# Patient Record
Sex: FEMALE | URBAN - METROPOLITAN AREA
[De-identification: names, ages, dates, MRNs, and addresses within clinical notes are randomized per-mention and may not be internally consistent; named-entity substitution may affect disease eponyms.]

---

## 2022-12-05 ENCOUNTER — APPOINTMENT (RX ONLY)
Dept: URBAN - METROPOLITAN AREA CLINIC 95 | Facility: CLINIC | Age: 67
Setting detail: DERMATOLOGY
End: 2022-12-05

## 2022-12-05 DIAGNOSIS — L20.89 OTHER ATOPIC DERMATITIS: ICD-10-CM

## 2022-12-05 PROBLEM — L20.84 INTRINSIC (ALLERGIC) ECZEMA: Status: ACTIVE | Noted: 2022-12-05

## 2022-12-05 PROCEDURE — ? PRESCRIPTION

## 2022-12-05 PROCEDURE — 99214 OFFICE O/P EST MOD 30 MIN: CPT

## 2022-12-05 PROCEDURE — ? PRESCRIPTION MEDICATION MANAGEMENT

## 2022-12-05 PROCEDURE — ? COUNSELING

## 2022-12-05 RX ORDER — TRIAMCINOLONE ACETONIDE 1 MG/G
OINTMENT TOPICAL
Qty: 454 | Refills: 3 | Status: ERX | COMMUNITY
Start: 2022-12-05

## 2022-12-05 RX ADMIN — TRIAMCINOLONE ACETONIDE: 1 OINTMENT TOPICAL at 00:00

## 2022-12-05 ASSESSMENT — LOCATION SIMPLE DESCRIPTION DERM
LOCATION SIMPLE: RIGHT PRETIBIAL REGION
LOCATION SIMPLE: LEFT FOREARM
LOCATION SIMPLE: RIGHT UPPER ARM
LOCATION SIMPLE: LEFT PRETIBIAL REGION
LOCATION SIMPLE: RIGHT LOWER BACK

## 2022-12-05 ASSESSMENT — LOCATION ZONE DERM
LOCATION ZONE: TRUNK
LOCATION ZONE: ARM
LOCATION ZONE: LEG

## 2022-12-05 ASSESSMENT — LOCATION DETAILED DESCRIPTION DERM
LOCATION DETAILED: LEFT VENTRAL PROXIMAL FOREARM
LOCATION DETAILED: LEFT PROXIMAL PRETIBIAL REGION
LOCATION DETAILED: RIGHT ANTERIOR DISTAL UPPER ARM
LOCATION DETAILED: RIGHT PROXIMAL PRETIBIAL REGION
LOCATION DETAILED: RIGHT INFERIOR MEDIAL MIDBACK

## 2022-12-05 ASSESSMENT — BSA RASH: BSA RASH: 26

## 2022-12-05 NOTE — HPI: RASH (ECZEMA)
How Severe Is Your Eczema?: moderate
Is This A New Presentation, Or A Follow-Up?: Rash
Additional History: Pt stated having rash on the body for the last few months. Pt also stated that she has had eczema in the past and rash presents itself the same .

## 2022-12-05 NOTE — PROCEDURE: PRESCRIPTION MEDICATION MANAGEMENT
Plan: Pt to start basic skin care regimen. Nothing scented and hypoallergenic. Dove sensitive soap bar, all free and clear detergent. Also moisturizer with Cetaphil or eucerin creams.
Render In Strict Bullet Format?: No
Detail Level: Simple

## 2023-01-30 ENCOUNTER — APPOINTMENT (RX ONLY)
Dept: URBAN - METROPOLITAN AREA CLINIC 95 | Facility: CLINIC | Age: 68
Setting detail: DERMATOLOGY
End: 2023-01-30

## 2023-01-30 DIAGNOSIS — L91.0 HYPERTROPHIC SCAR: ICD-10-CM

## 2023-01-30 PROCEDURE — ? INTRALESIONAL KENALOG

## 2023-01-30 PROCEDURE — ? COUNSELING

## 2023-01-30 PROCEDURE — 11900 INJECT SKIN LESIONS </W 7: CPT

## 2023-01-30 ASSESSMENT — LOCATION DETAILED DESCRIPTION DERM
LOCATION DETAILED: LEFT MEDIAL BREAST 9-10:00 REGION
LOCATION DETAILED: LOWER STERNUM
LOCATION DETAILED: SUBXIPHOID
LOCATION DETAILED: LEFT MEDIAL SUPERIOR CHEST
LOCATION DETAILED: RIGHT MEDIAL SUPERIOR CHEST

## 2023-01-30 ASSESSMENT — LOCATION SIMPLE DESCRIPTION DERM
LOCATION SIMPLE: LEFT BREAST
LOCATION SIMPLE: ABDOMEN
LOCATION SIMPLE: CHEST

## 2023-01-30 ASSESSMENT — LOCATION ZONE DERM: LOCATION ZONE: TRUNK

## 2023-01-30 NOTE — PROCEDURE: INTRALESIONAL KENALOG
Total Volume Injected (Ccs- Only Use Numbers And Decimals): 2.0
Expiration Date (Optional): 06/23
How Many Mls Were Removed From The 40 Mg/Ml (5ml) Vial When Preparing The Injectable Solution?: 0
Include Z78.9 (Other Specified Conditions Influencing Health Status) As An Associated Diagnosis?: No
Kenalog Preparation: Kenalog
Concentration Of Solution Injected (Mg/Ml): 20.0
Show Inventory Tab: Hide
Consent: The risks of atrophy were reviewed with the patient.
Medical Necessity Clause: This procedure was medically necessary because the lesions that were treated were:
Lot # (Optional): [id number]
Detail Level: Detailed
Administered By (Optional): Sudha Santillan
Validate Note Data When Using Inventory: Yes
Ndc# For Kenalog Only: 6158-3664-36

## 2023-03-27 ENCOUNTER — APPOINTMENT (RX ONLY)
Dept: URBAN - METROPOLITAN AREA CLINIC 95 | Facility: CLINIC | Age: 68
Setting detail: DERMATOLOGY
End: 2023-03-27

## 2023-03-27 DIAGNOSIS — L91.0 HYPERTROPHIC SCAR: ICD-10-CM

## 2023-03-27 PROCEDURE — ? INTRALESIONAL KENALOG

## 2023-03-27 PROCEDURE — ? COUNSELING

## 2023-03-27 PROCEDURE — 11900 INJECT SKIN LESIONS </W 7: CPT

## 2023-03-27 ASSESSMENT — LOCATION SIMPLE DESCRIPTION DERM: LOCATION SIMPLE: CHEST

## 2023-03-27 ASSESSMENT — LOCATION DETAILED DESCRIPTION DERM
LOCATION DETAILED: MIDDLE STERNUM
LOCATION DETAILED: RIGHT MEDIAL SUPERIOR CHEST

## 2023-03-27 ASSESSMENT — LOCATION ZONE DERM: LOCATION ZONE: TRUNK

## 2023-03-27 NOTE — PROCEDURE: INTRALESIONAL KENALOG
Validate Note Data When Using Inventory: Yes
X Size Of Lesion In Cm (Optional): 0
Detail Level: Detailed
Ndc# For Kenalog Only: 7265-9125-82
Lot # (Optional): [id number]
Medical Necessity Clause: This procedure was medically necessary because the lesions that were treated were:
Administered By (Optional): Zeke Cantor
Kenalog Preparation: Kenalog
Consent: The risks of atrophy were reviewed with the patient.
Treatment Number (Optional): 2
Include Z78.9 (Other Specified Conditions Influencing Health Status) As An Associated Diagnosis?: No
Show Inventory Tab: Hide
Expiration Date (Optional): 06/2024
Concentration Of Solution Injected (Mg/Ml): 20.0